# Patient Record
Sex: FEMALE | Race: WHITE | NOT HISPANIC OR LATINO | Employment: UNEMPLOYED | URBAN - METROPOLITAN AREA
[De-identification: names, ages, dates, MRNs, and addresses within clinical notes are randomized per-mention and may not be internally consistent; named-entity substitution may affect disease eponyms.]

---

## 2023-01-31 ENCOUNTER — HOSPITAL ENCOUNTER (EMERGENCY)
Facility: HOSPITAL | Age: 28
Discharge: HOME | End: 2023-01-31
Attending: EMERGENCY MEDICINE

## 2023-01-31 ENCOUNTER — APPOINTMENT (EMERGENCY)
Dept: RADIOLOGY | Facility: HOSPITAL | Age: 28
End: 2023-01-31

## 2023-01-31 VITALS
RESPIRATION RATE: 18 BRPM | SYSTOLIC BLOOD PRESSURE: 110 MMHG | WEIGHT: 119.05 LBS | OXYGEN SATURATION: 100 % | HEART RATE: 77 BPM | DIASTOLIC BLOOD PRESSURE: 72 MMHG | TEMPERATURE: 98.5 F
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
## 2023-01-31 DIAGNOSIS — S20.211A CHEST WALL CONTUSION, RIGHT, INITIAL ENCOUNTER: ICD-10-CM

## 2023-01-31 LAB
EXT PREGNANCY TEST URINE: NEGATIVE
EXT. CONTROL: NORMAL

## 2023-01-31 NOTE — DISCHARGE INSTRUCTIONS
Use Tylenol ibuprofen as needed for minor pain  Discussed return emergency department for any newly developing or worsening signs or symptoms  Patient understood all instructions prior to discharge and plan agreed upon by patient and myself

## 2023-01-31 NOTE — Clinical Note
Lawyer Minor was seen and treated in our emergency department on 1/31/2023  Diagnosis:     Ambreen You  is off the rest of the shift today, may return to work on return date  She may return on this date: 02/01/2023         If you have any questions or concerns, please don't hesitate to call        Ester Edwards PA-C    ______________________________           _______________          _______________  Hospital Representative                              Date                                Time
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
## 2024-10-09 ENCOUNTER — OFFICE VISIT (OUTPATIENT)
Dept: FAMILY MEDICINE CLINIC | Facility: CLINIC | Age: 29
End: 2024-10-09

## 2024-10-09 VITALS
TEMPERATURE: 98.4 F | OXYGEN SATURATION: 99 % | SYSTOLIC BLOOD PRESSURE: 108 MMHG | HEART RATE: 74 BPM | DIASTOLIC BLOOD PRESSURE: 72 MMHG | WEIGHT: 125 LBS | BODY MASS INDEX: 19.62 KG/M2 | HEIGHT: 67 IN

## 2024-10-09 DIAGNOSIS — Z11.59 NEED FOR HEPATITIS C SCREENING TEST: ICD-10-CM

## 2024-10-09 DIAGNOSIS — R53.82 CHRONIC FATIGUE: Primary | ICD-10-CM

## 2024-10-09 DIAGNOSIS — F41.1 GENERALIZED ANXIETY DISORDER: ICD-10-CM

## 2024-10-09 DIAGNOSIS — R11.2 NAUSEA AND VOMITING, UNSPECIFIED VOMITING TYPE: ICD-10-CM

## 2024-10-09 DIAGNOSIS — Z11.4 SCREENING FOR HIV (HUMAN IMMUNODEFICIENCY VIRUS): ICD-10-CM

## 2024-10-09 LAB — SL AMB POCT URINE HCG: NEGATIVE

## 2024-10-09 PROCEDURE — 81025 URINE PREGNANCY TEST: CPT | Performed by: FAMILY MEDICINE

## 2024-10-09 PROCEDURE — 99204 OFFICE O/P NEW MOD 45 MIN: CPT | Performed by: FAMILY MEDICINE

## 2024-10-09 RX ORDER — FAMOTIDINE 20 MG/1
20 TABLET, FILM COATED ORAL 2 TIMES DAILY
Qty: 60 TABLET | Refills: 0 | Status: SHIPPED | OUTPATIENT
Start: 2024-10-09 | End: 2024-10-09

## 2024-10-09 RX ORDER — FAMOTIDINE 20 MG/1
20 TABLET, FILM COATED ORAL 2 TIMES DAILY
Qty: 60 TABLET | Refills: 0 | Status: SHIPPED | OUTPATIENT
Start: 2024-10-09 | End: 2024-11-08

## 2024-10-09 RX ORDER — FLUOXETINE 10 MG/1
10 CAPSULE ORAL DAILY
Qty: 30 CAPSULE | Refills: 0 | Status: SHIPPED | OUTPATIENT
Start: 2024-10-09 | End: 2024-11-08

## 2024-10-09 NOTE — ASSESSMENT & PLAN NOTE
Never had prior screening    Ordered HIV lab    Orders:    HIV 1/2 AG/AB w Reflex [facility] for 2 yr old and above; Future

## 2024-10-09 NOTE — ASSESSMENT & PLAN NOTE
1 year history of fatigue, no fevers, night sweats, drastic weight change, no heavy menstrual cycles.      CMP  CBC  Start B1, B12, and vit D for 3 months per patient's discretion  Return in 4 weeks to discuss labs and check progress    Orders:    CBC and differential; Future    Comprehensive metabolic panel; Future    TSH, 3rd generation with Free T4 reflex; Future

## 2024-10-09 NOTE — PROGRESS NOTES
Ambulatory Visit  Name: Irene Wolfe      : 1995      MRN: [id number]  Encounter Provider: Suma Juárez MD  Encounter Date: 10/9/2024   Encounter department: [facility]    Assessment & Plan  Need for hepatitis C screening test  Never had prior screening    Ordered hep C antibody    Orders:    Hepatitis C Antibody; Future    Screening for HIV (human immunodeficiency virus)  Never had prior screening    Ordered HIV lab    Orders:    HIV 1/2 AG/AB w Reflex [facility] for 2 yr old and above; Future    Chronic fatigue  1 year history of fatigue, no fevers, night sweats, drastic weight change, no heavy menstrual cycles.      CMP  CBC  Start B1, B12, and vit D for 3 months per patient's discretion  Return in 4 weeks to discuss labs and check progress    Orders:    CBC and differential; Future    Comprehensive metabolic panel; Future    TSH, 3rd generation with Free T4 reflex; Future    Nausea and vomiting, unspecified vomiting type  1 year history of nausea and vomiting, worse with strong smells. Burning epigastric pain.     Famotidine 20mg BID    Orders:    POCT urine HCG    famotidine (PEPCID) 20 mg tablet; Take 1 tablet (20 mg total) by mouth 2 (two) times a day    Generalized anxiety disorder  VINCE-7 score 4. Patient reports poor sleep, feeling anxious/annoyed. She sees therapist.     Fluoxetine 10mg daily  Continue seeing therapist    Orders:    FLUoxetine (PROzac) 10 mg capsule; Take 1 capsule (10 mg total) by mouth daily       History of Present Illness     The patient is a(n) 29 y.o. female with no significant PMH who presents to the clinic with complaint(s) of  fatigue and nausea with occasional vomiting for 1 year. Nausea started after going to [employer] and smelling perfume. She is unsure of prior COVID illness. Patient has not noticed any foods that cause nausea. Since that time, patient has stopped using perfumes. Patient also complains of burning epigastric pain with  "reflux. She also notes a sour taste in her mouth. Patient has the tendency to lay down and sleep shortly after eating. She has stopped eating citrus foods due to foul smelling flatulence. Vomit is yellow without blood. Patient has tried coconut water, Pepto bismol and Tums which have not helped. Patient reports she cannot gain weight due to anxiety issues, but does not report drastic gain or weight in a short amount of time. Diet consists of bread, salads, muffin, fruits, if eating meat - mostly pork.     Patient feels fatigue daily. Patient reports that she does not sleep well. She works as a  and works around 11 hours a day. Patient states at times she will come home from work around 5, eat instant ramen and go to sleep by 630PM. The following day, patient will note she won't be able to sleep. She states she has loss of interest in sexual relations with her partner. She states she has anxiety. She does not take medications, but sees a therapist. Patient does not report heavy menstrual bleeding. Patient states that her mood fluctuates. Some days patient will feel angry at minor inconveniences and other days she will feel anxious and \"annoyed\". Patient also reports feeling uncomfortable/anxious when something is out of place.      Fatigue  This is a chronic problem. The current episode started more than 1 year ago. The problem occurs constantly. Associated symptoms include fatigue, headaches, nausea and vomiting. Pertinent negatives include no chest pain, chills, congestion, coughing, fever or myalgias.   Nausea  Associated symptoms include fatigue, headaches, nausea and vomiting. Pertinent negatives include no chest pain, chills, congestion, coughing, fever or myalgias.   Diarrhea   Associated symptoms include headaches and vomiting. Pertinent negatives include no chills, coughing, fever or myalgias.   Headache        Review of Systems   Constitutional:  Positive for fatigue. Negative for chills and " "fever.   HENT:  Negative for congestion, rhinorrhea and trouble swallowing.    Respiratory:  Negative for cough and shortness of breath.    Cardiovascular:  Negative for chest pain.   Gastrointestinal:  Positive for nausea and vomiting. Negative for constipation and diarrhea.   Genitourinary:  Negative for dysuria.   Musculoskeletal:  Negative for myalgias.   Neurological:  Positive for headaches.           Objective     /72 (BP Location: Left arm, Patient Position: Sitting, Cuff Size: Standard)   Pulse 74   Temp 98.4 °F (36.9 °C) (Temporal)   Ht 5' 6.8\" (1.697 m)   Wt 56.7 kg (125 lb)   SpO2 99%   BMI 19.70 kg/m²     Physical Exam  HENT:      Mouth/Throat:      Mouth: Mucous membranes are dry.      Pharynx: Oropharynx is clear. No oropharyngeal exudate or posterior oropharyngeal erythema.   Eyes:      Extraocular Movements: Extraocular movements intact.      Conjunctiva/sclera: Conjunctivae normal.   Cardiovascular:      Rate and Rhythm: Normal rate and regular rhythm.      Pulses: Normal pulses.      Heart sounds: Normal heart sounds.   Pulmonary:      Effort: Pulmonary effort is normal.      Breath sounds: Normal breath sounds.   Abdominal:      General: Abdomen is flat. Bowel sounds are normal. There is no distension.      Palpations: Abdomen is soft. There is no mass.      Tenderness: There is abdominal tenderness (epigastric). There is no guarding.   Skin:     General: Skin is warm and dry.         "

## 2024-10-09 NOTE — ASSESSMENT & PLAN NOTE
1 year history of nausea and vomiting, worse with strong smells. Burning epigastric pain.     Famotidine 20mg BID    Orders:    POCT urine HCG    famotidine (PEPCID) 20 mg tablet; Take 1 tablet (20 mg total) by mouth 2 (two) times a day

## 2024-10-12 ENCOUNTER — APPOINTMENT (OUTPATIENT)
Dept: LAB | Facility: CLINIC | Age: 29
End: 2024-10-12

## 2024-10-12 DIAGNOSIS — Z11.4 SCREENING FOR HIV (HUMAN IMMUNODEFICIENCY VIRUS): ICD-10-CM

## 2024-10-12 DIAGNOSIS — R53.82 CHRONIC FATIGUE: ICD-10-CM

## 2024-10-12 DIAGNOSIS — Z11.59 NEED FOR HEPATITIS C SCREENING TEST: ICD-10-CM

## 2024-10-12 LAB
ALBUMIN SERPL BCG-MCNC: 4.6 G/DL (ref 3.5–5)
ALP SERPL-CCNC: 47 U/L (ref 34–104)
ALT SERPL W P-5'-P-CCNC: 8 U/L (ref 7–52)
ANION GAP SERPL CALCULATED.3IONS-SCNC: 9 MMOL/L (ref 4–13)
AST SERPL W P-5'-P-CCNC: 17 U/L (ref 13–39)
BASOPHILS # BLD AUTO: 0.04 THOUSANDS/ΜL (ref 0–0.1)
BASOPHILS NFR BLD AUTO: 1 % (ref 0–1)
BILIRUB SERPL-MCNC: 0.66 MG/DL (ref 0.2–1)
BUN SERPL-MCNC: 10 MG/DL (ref 5–25)
CALCIUM SERPL-MCNC: 9.4 MG/DL (ref 8.4–10.2)
CHLORIDE SERPL-SCNC: 103 MMOL/L (ref 96–108)
CO2 SERPL-SCNC: 27 MMOL/L (ref 21–32)
CREAT SERPL-MCNC: 0.59 MG/DL (ref 0.6–1.3)
EOSINOPHIL # BLD AUTO: 0.06 THOUSAND/ΜL (ref 0–0.61)
EOSINOPHIL NFR BLD AUTO: 1 % (ref 0–6)
ERYTHROCYTE [DISTWIDTH] IN BLOOD BY AUTOMATED COUNT: 13.4 % (ref 11.6–15.1)
GFR SERPL CREATININE-BSD FRML MDRD: 123 ML/MIN/1.73SQ M
GLUCOSE P FAST SERPL-MCNC: 90 MG/DL (ref 65–99)
HCT VFR BLD AUTO: 40.7 % (ref 34.8–46.1)
HGB BLD-MCNC: 13.1 G/DL (ref 11.5–15.4)
IMM GRANULOCYTES # BLD AUTO: 0.02 THOUSAND/UL (ref 0–0.2)
IMM GRANULOCYTES NFR BLD AUTO: 0 % (ref 0–2)
LYMPHOCYTES # BLD AUTO: 1.76 THOUSANDS/ΜL (ref 0.6–4.47)
LYMPHOCYTES NFR BLD AUTO: 38 % (ref 14–44)
MCH RBC QN AUTO: 28.4 PG (ref 26.8–34.3)
MCHC RBC AUTO-ENTMCNC: 32.2 G/DL (ref 31.4–37.4)
MCV RBC AUTO: 88 FL (ref 82–98)
MONOCYTES # BLD AUTO: 0.37 THOUSAND/ΜL (ref 0.17–1.22)
MONOCYTES NFR BLD AUTO: 8 % (ref 4–12)
NEUTROPHILS # BLD AUTO: 2.37 THOUSANDS/ΜL (ref 1.85–7.62)
NEUTS SEG NFR BLD AUTO: 52 % (ref 43–75)
NRBC BLD AUTO-RTO: 0 /100 WBCS
PLATELET # BLD AUTO: 230 THOUSANDS/UL (ref 149–390)
PMV BLD AUTO: 9.3 FL (ref 8.9–12.7)
POTASSIUM SERPL-SCNC: 4.3 MMOL/L (ref 3.5–5.3)
PROT SERPL-MCNC: 7 G/DL (ref 6.4–8.4)
RBC # BLD AUTO: 4.62 MILLION/UL (ref 3.81–5.12)
SODIUM SERPL-SCNC: 139 MMOL/L (ref 135–147)
TSH SERPL DL<=0.05 MIU/L-ACNC: 1.43 UIU/ML (ref 0.45–4.5)
WBC # BLD AUTO: 4.62 THOUSAND/UL (ref 4.31–10.16)

## 2024-10-12 PROCEDURE — 80053 COMPREHEN METABOLIC PANEL: CPT

## 2024-10-12 PROCEDURE — 87389 HIV-1 AG W/HIV-1&-2 AB AG IA: CPT

## 2024-10-12 PROCEDURE — 36415 COLL VENOUS BLD VENIPUNCTURE: CPT

## 2024-10-12 PROCEDURE — 86803 HEPATITIS C AB TEST: CPT

## 2024-10-12 PROCEDURE — 84443 ASSAY THYROID STIM HORMONE: CPT

## 2024-10-12 PROCEDURE — 85025 COMPLETE CBC W/AUTO DIFF WBC: CPT

## 2024-10-13 LAB
HCV AB SER QL: NORMAL
HIV 1+2 AB+HIV1 P24 AG SERPL QL IA: NORMAL
HIV 2 AB SERPL QL IA: NORMAL
HIV1 AB SERPL QL IA: NORMAL
HIV1 P24 AG SERPL QL IA: NORMAL

## 2024-11-06 ENCOUNTER — ANNUAL EXAM (OUTPATIENT)
Dept: FAMILY MEDICINE CLINIC | Facility: CLINIC | Age: 29
End: 2024-11-06

## 2024-11-06 VITALS
HEIGHT: 67 IN | WEIGHT: 126 LBS | BODY MASS INDEX: 19.78 KG/M2 | OXYGEN SATURATION: 98 % | SYSTOLIC BLOOD PRESSURE: 94 MMHG | HEART RATE: 77 BPM | DIASTOLIC BLOOD PRESSURE: 62 MMHG | TEMPERATURE: 98 F

## 2024-11-06 DIAGNOSIS — N76.0 BACTERIAL VAGINOSIS: ICD-10-CM

## 2024-11-06 DIAGNOSIS — F41.1 GENERALIZED ANXIETY DISORDER: ICD-10-CM

## 2024-11-06 DIAGNOSIS — Z12.4 PAP SMEAR FOR CERVICAL CANCER SCREENING: Primary | ICD-10-CM

## 2024-11-06 DIAGNOSIS — B96.89 BACTERIAL VAGINOSIS: ICD-10-CM

## 2024-11-06 PROBLEM — Z01.419 WELL WOMAN EXAM: Status: RESOLVED | Noted: 2024-11-06 | Resolved: 2024-11-06

## 2024-11-06 PROBLEM — Z01.419 WELL WOMAN EXAM: Status: ACTIVE | Noted: 2024-11-06

## 2024-11-06 PROBLEM — N89.8 VAGINAL DISCHARGE: Status: ACTIVE | Noted: 2024-11-06

## 2024-11-06 LAB
CLUE CELLS SPEC QL WET PREP: YES
PH SMN: 7 [PH]
T VAGINALIS VAG QL WET PREP: NO
YEAST VAG QL WET PREP: NO

## 2024-11-06 PROCEDURE — 87591 N.GONORRHOEAE DNA AMP PROB: CPT

## 2024-11-06 PROCEDURE — 99214 OFFICE O/P EST MOD 30 MIN: CPT | Performed by: FAMILY MEDICINE

## 2024-11-06 PROCEDURE — 87491 CHLMYD TRACH DNA AMP PROBE: CPT

## 2024-11-06 PROCEDURE — 87624 HPV HI-RISK TYP POOLED RSLT: CPT

## 2024-11-06 PROCEDURE — 87210 SMEAR WET MOUNT SALINE/INK: CPT | Performed by: FAMILY MEDICINE

## 2024-11-06 PROCEDURE — G0145 SCR C/V CYTO,THINLAYER,RESCR: HCPCS

## 2024-11-06 RX ORDER — FLUOXETINE 10 MG/1
10 CAPSULE ORAL DAILY
Qty: 30 CAPSULE | Refills: 0 | Status: SHIPPED | OUTPATIENT
Start: 2024-11-06

## 2024-11-06 RX ORDER — METRONIDAZOLE 500 MG/1
500 TABLET ORAL EVERY 12 HOURS SCHEDULED
Qty: 14 TABLET | Refills: 0 | Status: SHIPPED | OUTPATIENT
Start: 2024-11-06 | End: 2024-11-13

## 2024-11-06 NOTE — ASSESSMENT & PLAN NOTE
Routine pap smear.    Plan:   F/u in 4 weeks to discuss results   Repeat pap smear in 3 years    Orders:    Chlamydia/GC amplified DNA by PCR; Future    POCT wet mount

## 2024-11-06 NOTE — ASSESSMENT & PLAN NOTE
Excessive discharge seen on routine pap smear. POCT wet mount displayed clue cells and pH of 7-7.5.      Orders:    metroNIDAZOLE (FLAGYL) 500 mg tablet; Take 1 tablet (500 mg total) by mouth every 12 (twelve) hours for 7 days    POCT wet mount

## 2024-11-06 NOTE — PROGRESS NOTES
"Ambulatory Visit  Name: Irene Wolfe      : 1995      MRN: [id number]  Encounter Provider: Suma Juárez MD  Encounter Date: 2024   Encounter department: [facility]    Assessment & Plan  Pap smear for cervical cancer screening  Routine pap smear.    Plan:   F/u in 4 weeks to discuss results   Repeat pap smear in 3 years    Orders:    Chlamydia/GC amplified DNA by PCR; Future    POCT wet mount    Bacterial vaginosis  Excessive discharge seen on routine pap smear. POCT wet mount displayed clue cells and pH of 7-7.5.      Orders:    metroNIDAZOLE (FLAGYL) 500 mg tablet; Take 1 tablet (500 mg total) by mouth every 12 (twelve) hours for 7 days    POCT wet mount       History of Present Illness     The patient is a(n) 29 y.o. female who presents to the clinic for routine pap smear. Patient reports vaginal dryness with intercourse. She has been using lubricants which occasionally help. She states she has noticed a difference in mood since beginning an SSRI. Patient reports feeling, \"more level\".     Gynecologic Exam  She complains of vaginal discharge. Pertinent negatives include no dysuria.         Review of Systems   Genitourinary:  Positive for dyspareunia (due to dryness) and vaginal discharge. Negative for dysuria.           Objective     BP 94/62 (BP Location: Right arm, Patient Position: Sitting, Cuff Size: Standard)   Pulse 77   Temp 98 °F (36.7 °C) (Temporal)   Ht 5' 6.6\" (1.692 m)   Wt 57.2 kg (126 lb)   SpO2 98%   BMI 19.97 kg/m²     Physical Exam    "

## 2024-11-07 LAB
C TRACH DNA SPEC QL NAA+PROBE: NEGATIVE
HPV HR 12 DNA CVX QL NAA+PROBE: NEGATIVE
HPV16 DNA CVX QL NAA+PROBE: NEGATIVE
HPV18 DNA CVX QL NAA+PROBE: NEGATIVE
N GONORRHOEA DNA SPEC QL NAA+PROBE: NEGATIVE

## 2024-11-08 PROBLEM — Z11.59 NEED FOR HEPATITIS C SCREENING TEST: Status: RESOLVED | Noted: 2024-10-09 | Resolved: 2024-11-08

## 2024-11-08 PROBLEM — Z11.4 SCREENING FOR HIV (HUMAN IMMUNODEFICIENCY VIRUS): Status: RESOLVED | Noted: 2024-10-09 | Resolved: 2024-11-08

## 2024-11-12 LAB
LAB AP GYN PRIMARY INTERPRETATION: NORMAL
Lab: NORMAL